# Patient Record
Sex: FEMALE | Race: WHITE | NOT HISPANIC OR LATINO | Employment: OTHER | URBAN - METROPOLITAN AREA
[De-identification: names, ages, dates, MRNs, and addresses within clinical notes are randomized per-mention and may not be internally consistent; named-entity substitution may affect disease eponyms.]

---

## 2019-06-11 ENCOUNTER — TELEPHONE (OUTPATIENT)
Dept: HEMATOLOGY/ONCOLOGY | Facility: CLINIC | Age: 59
End: 2019-06-11

## 2019-06-11 NOTE — TELEPHONE ENCOUNTER
Received referral for pt with diagnosis of anemia.  Spoke with pt who has concerns of transportation concerns making scheduled appointment.  Agreeable with appointment, slip mailed.

## 2019-06-12 ENCOUNTER — TELEPHONE (OUTPATIENT)
Dept: HEMATOLOGY/ONCOLOGY | Facility: CLINIC | Age: 59
End: 2019-06-12

## 2019-06-12 DIAGNOSIS — N18.9 ANEMIA IN CHRONIC KIDNEY DISEASE: Primary | ICD-10-CM

## 2019-06-12 DIAGNOSIS — D63.1 ANEMIA IN CHRONIC KIDNEY DISEASE: Primary | ICD-10-CM

## 2019-06-12 NOTE — TELEPHONE ENCOUNTER
----- Message from Sari Nugent RN sent at 6/12/2019 10:12 AM CDT -----  Can you put in a referral for hematology oncology

## 2019-06-20 ENCOUNTER — TELEPHONE (OUTPATIENT)
Dept: HEMATOLOGY/ONCOLOGY | Facility: CLINIC | Age: 59
End: 2019-06-20

## 2019-06-20 NOTE — TELEPHONE ENCOUNTER
----- Message from Tammie Banks sent at 6/20/2019  8:24 AM CDT -----  Contact: Sanjuana  Type: Needs Medical Advice    Who Called:  patient  Best Call Back Number: [phone]  Additional Information:  Calling to ask for a STEPHANIE form to be faxed to her at [phone] so her previous medical records will be there for appointment 6/26/19. Please call to advise. Thanks!

## 2019-06-20 NOTE — TELEPHONE ENCOUNTER
Spoke to pt to get fax number of referred provider to request records for pt apt. Fax number provided. Pt records are also found in media in epic.

## 2019-06-24 ENCOUNTER — TELEPHONE (OUTPATIENT)
Dept: HEMATOLOGY/ONCOLOGY | Facility: CLINIC | Age: 59
End: 2019-06-24

## 2019-06-24 NOTE — TELEPHONE ENCOUNTER
Spoke to pt to confirm that we received pt records through media in epic. Pt verbalized understanding.

## 2019-06-24 NOTE — TELEPHONE ENCOUNTER
----- Message from Yamel Jacobs sent at 6/24/2019  8:31 AM CDT -----  Contact: self   Patient called to check status of records that is being sent from Dr Markham office, please call back at [phone] (home)

## 2019-06-26 ENCOUNTER — INITIAL CONSULT (OUTPATIENT)
Dept: HEMATOLOGY/ONCOLOGY | Facility: CLINIC | Age: 59
End: 2019-06-26
Payer: MEDICAID

## 2019-06-26 VITALS
HEART RATE: 85 BPM | SYSTOLIC BLOOD PRESSURE: 133 MMHG | RESPIRATION RATE: 16 BRPM | WEIGHT: 178 LBS | TEMPERATURE: 98 F | BODY MASS INDEX: 30.39 KG/M2 | HEIGHT: 64 IN | DIASTOLIC BLOOD PRESSURE: 82 MMHG

## 2019-06-26 DIAGNOSIS — Z79.899 ON STATIN THERAPY: ICD-10-CM

## 2019-06-26 DIAGNOSIS — N18.30 STAGE 3 CHRONIC KIDNEY DISEASE: ICD-10-CM

## 2019-06-26 DIAGNOSIS — I10 ESSENTIAL HYPERTENSION: ICD-10-CM

## 2019-06-26 DIAGNOSIS — Z79.01 ANTICOAGULATED: ICD-10-CM

## 2019-06-26 DIAGNOSIS — D50.8 OTHER IRON DEFICIENCY ANEMIA: Primary | ICD-10-CM

## 2019-06-26 DIAGNOSIS — Z86.73 HISTORY OF EMBOLIC STROKE: ICD-10-CM

## 2019-06-26 PROBLEM — D50.9 IRON DEFICIENCY ANEMIA: Status: ACTIVE | Noted: 2019-06-26

## 2019-06-26 PROBLEM — N18.9 CKD (CHRONIC KIDNEY DISEASE): Status: ACTIVE | Noted: 2019-06-26

## 2019-06-26 PROCEDURE — 99213 OFFICE O/P EST LOW 20 MIN: CPT | Mod: PBBFAC,25 | Performed by: INTERNAL MEDICINE

## 2019-06-26 PROCEDURE — 99999 PR PBB SHADOW E&M-EST. PATIENT-LVL III: ICD-10-PCS | Mod: PBBFAC,,, | Performed by: INTERNAL MEDICINE

## 2019-06-26 PROCEDURE — 99205 PR OFFICE/OUTPT VISIT, NEW, LEVL V, 60-74 MIN: ICD-10-PCS | Mod: S$PBB,,, | Performed by: INTERNAL MEDICINE

## 2019-06-26 PROCEDURE — 99999 PR PBB SHADOW E&M-EST. PATIENT-LVL III: CPT | Mod: PBBFAC,,, | Performed by: INTERNAL MEDICINE

## 2019-06-26 PROCEDURE — 99205 OFFICE O/P NEW HI 60 MIN: CPT | Mod: S$PBB,,, | Performed by: INTERNAL MEDICINE

## 2019-06-26 PROCEDURE — G0444 DEPRESSION SCREEN ANNUAL: HCPCS | Mod: PBBFAC | Performed by: INTERNAL MEDICINE

## 2019-06-26 RX ORDER — CLOPIDOGREL BISULFATE 75 MG/1
TABLET ORAL
Refills: 11 | COMMUNITY
Start: 2019-04-30

## 2019-06-26 RX ORDER — INSULIN DEGLUDEC 200 U/ML
INJECTION, SOLUTION SUBCUTANEOUS
Refills: 2 | COMMUNITY
Start: 2019-06-01 | End: 2019-11-06 | Stop reason: SDUPTHER

## 2019-06-26 RX ORDER — INSULIN ASPART 100 [IU]/ML
INJECTION, SOLUTION INTRAVENOUS; SUBCUTANEOUS
Refills: 19 | COMMUNITY
Start: 2019-05-01

## 2019-06-26 RX ORDER — CARVEDILOL 25 MG/1
TABLET ORAL
Refills: 1 | COMMUNITY
Start: 2019-04-03

## 2019-06-26 RX ORDER — CHOLECALCIFEROL (VITAMIN D3) 125 MCG
5000 CAPSULE ORAL DAILY
COMMUNITY

## 2019-06-26 RX ORDER — FERROUS SULFATE 324(65)MG
325 TABLET, DELAYED RELEASE (ENTERIC COATED) ORAL DAILY
COMMUNITY

## 2019-06-26 RX ORDER — LIRAGLUTIDE 6 MG/ML
INJECTION SUBCUTANEOUS
Refills: 11 | COMMUNITY
Start: 2019-06-01

## 2019-06-26 RX ORDER — LEVOTHYROXINE SODIUM 75 UG/1
75 TABLET ORAL DAILY
Refills: 6 | COMMUNITY
Start: 2019-06-07

## 2019-06-26 RX ORDER — AMLODIPINE AND BENAZEPRIL HYDROCHLORIDE 5; 10 MG/1; MG/1
CAPSULE ORAL
Refills: 1 | COMMUNITY
Start: 2019-04-02

## 2019-06-26 RX ORDER — LOVASTATIN 40 MG/1
40 TABLET ORAL NIGHTLY
Refills: 1 | COMMUNITY
Start: 2019-06-19

## 2019-06-26 RX ORDER — SODIUM BICARBONATE 325 MG/1
325 TABLET ORAL 4 TIMES DAILY
COMMUNITY

## 2019-06-26 NOTE — PROGRESS NOTES
Anemia      Sanjuana Mendoza is a 58 y.o.  Pt with history of anemia treated with oral iron by Dr Markham at [facility]. SHe is unsure why she is anemic. She has CKD and is on plavix after suffering a stroke   She is changing to [facility] due to insurance purposes She is tolerating synthroid for thyroid disease  Past Medical History:   Diagnosis Date    Anemia     Diabetes     Hyperthyroidism     Stroke      Past Surgical History:   Procedure Laterality Date    kidney stone removal         Current Outpatient Medications:     amlodipine-benazepril 5-10 mg (LOTREL) 5-10 mg per capsule, , Disp: , Rfl: 1    carvedilol (COREG) 25 MG tablet, , Disp: , Rfl: 1    cholecalciferol, vitamin D3, 5,000 unit capsule, Take 5,000 Units by mouth once daily., Disp: , Rfl:     clopidogrel (PLAVIX) 75 mg tablet, , Disp: , Rfl: 11    ferrous sulfate 324 mg (65 mg iron) TbEC, Take 325 mg by mouth once daily., Disp: , Rfl:     levothyroxine (SYNTHROID) 75 MCG tablet, Take 75 mcg by mouth once daily., Disp: , Rfl: 6    lovastatin (MEVACOR) 40 MG tablet, Take 40 mg by mouth nightly., Disp: , Rfl: 1    NOVOLOG FLEXPEN U-100 INSULIN 100 unit/mL (3 mL) InPn pen, , Disp: , Rfl: 19    sodium bicarbonate 325 MG tablet, Take 325 mg by mouth 4 (four) times daily., Disp: , Rfl:     TRESIBA FLEXTOUCH U-200 200 unit/mL (3 mL) InPn, INJECT 40 UNITS SUBCUTANEOUSLY ONCE DAILY, Disp: , Rfl: 2    VICTOZA 3-HERB 0.6 mg/0.1 mL (18 mg/3 mL) PnIj, INJECT 1.8 MG SUBCUTANEOUSLY ONCE DAILY FOR 30 DAYS, Disp: , Rfl: 11  Review of patient's allergies indicates:  No Known Allergies  Social History     Tobacco Use    Smoking status: Never Smoker    Smokeless tobacco: Never Used   Substance Use Topics    Alcohol use: Not Currently    Drug use: Not on file     History reviewed. No pertinent family history.    CONSTITUTIONAL: No fevers, chills, night sweats, wt. loss, appetite changes  SKIN: no rashes or itching  ENT: No headaches, head trauma,  "vision changes, or eye pain  LYMPH NODES: None noticed   CV: No chest pain, palpitations.   RESP: No dyspnea on exertion, cough, wheezing, or hemoptysis  GI: No nausea, emesis, diarrhea, constipation, melena, hematochezia, pain.   : No dysuria, hematuria, urgency, or frequency   HEME: No easy bruising, bleeding problems  PSYCHIATRIC: No depression, anxiety, psychosis, hallucinations.  NEURO: No seizures, memory loss, dizziness or difficulty sleeping  MSK: No arthralgias or joint swelling         /82   Pulse 85   Temp 98 °F (36.7 °C) (Oral)   Resp 16   Ht 5' 4" (1.626 m)   Wt 80.7 kg (178 lb)   BMI 30.55 kg/m²   Gen: NAD, A and O x3,   Psych: pleasant affect, normal thought process  Eyes: Pupils round and non dilated, EOM intact  Nose: Nares patent  OP clear, mucosa patent  Neck: suppple, no JVD, trachea midline, no palpable mass, no adenopathy  Lungs: CTAB, no wheezes, no use of accessory muscles  CV: S1S2 with RRR, No mrg  Abd: soft, NTND, + BS, No HSM, no ascites  Extr: No CCE, ALEJANDRO, strength normal, good capillary refill  Neuro: steady gait, CNs grossly intact  Skin: intact, no lesions noted  Rheum: No joint swelling      No labs to review    Other iron deficiency anemia  -     Reticulocytes; Future; Expected date: 06/26/2019  -     Methylmalonic acid, serum; Future; Expected date: 06/26/2019  -     Iron and TIBC; Future; Expected date: 06/26/2019  -     Lactate dehydrogenase; Future; Expected date: 06/26/2019  -     Immunoglobulin free LT chains blood; Future; Expected date: 06/26/2019  -     Immunoglobulins (IgG, IgA, IgM) Quantitative; Future; Expected date: 06/26/2019  -     TSH; Future; Expected date: 06/26/2019  -     Folate; Future; Expected date: 06/26/2019  -     Beth test, indirect, qualitative; Future; Expected date: 06/26/2019  -     US Abdomen Complete; Future; Expected date: 06/26/2019    History of embolic stroke    Essential hypertension    Stage 3 chronic kidney " disease    Anticoagulated    On statin therapy    no pain   No falls  Low risk for thrombosis on plavix  No depression  Pt is to continue meds for HTN and thyroid disease.   Referral to PCP here   RTC 2-3 weeks after labs and ultrasound to review results and further recs  Thank you for allowing me to evaluate and participate in the care of this pleasant patient. Please fell free to call me with any questions or concerns.    Darby Davila MD    This note was dictated with Dragon and briefly proofread. Please excuse any sentences that may be unclear or words misspelled

## 2019-06-26 NOTE — LETTER
June 26, 2019      Monica Markham MD  [address]  [place] [address]           [facility] - Hematology Oncology  [address] [place] [address]  Phone: [phone]          Patient: Sanjuana Mendoza   MR Number: [id number]   YOB: 1960   Date of Visit: 6/26/2019       Dear Dr. Monica Markham:    Thank you for referring Sanjuana Mendoza to me for evaluation. Attached you will find relevant portions of my assessment and plan of care.    If you have questions, please do not hesitate to call me. I look forward to following Sanjuana Mendoza along with you.    Sincerely,    Darby Moreno MD    Enclosure  CC:  No Recipients    If you would like to receive this communication electronically, please contact [email or web address] or [phone] to request more information on [employer] Link access.    For providers and/or their staff who would like to refer a patient to [facility], please contact us through our one-stop-shop provider referral line, [facility], at [phone].    If you feel you have received this communication in error or would no longer like to receive these types of communications, please e-mail [email or web address]

## 2019-07-11 ENCOUNTER — HOSPITAL ENCOUNTER (OUTPATIENT)
Dept: RADIOLOGY | Facility: HOSPITAL | Age: 59
Discharge: HOME | End: 2019-07-11
Attending: INTERNAL MEDICINE
Payer: MEDICAID

## 2019-07-11 DIAGNOSIS — D50.8 OTHER IRON DEFICIENCY ANEMIA: ICD-10-CM

## 2019-07-11 PROCEDURE — 76700 US ABDOMEN COMPLETE: ICD-10-PCS | Mod: 26,,, | Performed by: RADIOLOGY

## 2019-07-11 PROCEDURE — 76700 US EXAM ABDOM COMPLETE: CPT | Mod: 26,,, | Performed by: RADIOLOGY

## 2019-07-11 PROCEDURE — 76700 US EXAM ABDOM COMPLETE: CPT | Mod: TC

## 2019-08-14 ENCOUNTER — OFFICE VISIT (OUTPATIENT)
Dept: HEMATOLOGY/ONCOLOGY | Facility: CLINIC | Age: 59
End: 2019-08-14
Payer: MEDICAID

## 2019-08-14 VITALS
OXYGEN SATURATION: 97 % | SYSTOLIC BLOOD PRESSURE: 117 MMHG | HEART RATE: 92 BPM | DIASTOLIC BLOOD PRESSURE: 62 MMHG | BODY MASS INDEX: 29.82 KG/M2 | WEIGHT: 179 LBS | RESPIRATION RATE: 18 BRPM | HEIGHT: 65 IN

## 2019-08-14 DIAGNOSIS — D50.9 IRON DEFICIENCY ANEMIA, UNSPECIFIED IRON DEFICIENCY ANEMIA TYPE: Primary | ICD-10-CM

## 2019-08-14 DIAGNOSIS — K76.0 HEPATIC STEATOSIS: ICD-10-CM

## 2019-08-14 DIAGNOSIS — Z79.01 ANTICOAGULATED: ICD-10-CM

## 2019-08-14 DIAGNOSIS — N18.30 STAGE 3 CHRONIC KIDNEY DISEASE: ICD-10-CM

## 2019-08-14 DIAGNOSIS — Z79.899 ON STATIN THERAPY: ICD-10-CM

## 2019-08-14 DIAGNOSIS — R76.8 ELEVATED SERUM IMMUNOGLOBULIN FREE LIGHT CHAIN LEVEL: ICD-10-CM

## 2019-08-14 DIAGNOSIS — D51.9 ANEMIA DUE TO VITAMIN B12 DEFICIENCY, UNSPECIFIED B12 DEFICIENCY TYPE: ICD-10-CM

## 2019-08-14 DIAGNOSIS — R63.5 WEIGHT GAIN: ICD-10-CM

## 2019-08-14 PROCEDURE — 99213 OFFICE O/P EST LOW 20 MIN: CPT | Mod: PBBFAC | Performed by: INTERNAL MEDICINE

## 2019-08-14 PROCEDURE — 99215 OFFICE O/P EST HI 40 MIN: CPT | Mod: S$PBB,,, | Performed by: INTERNAL MEDICINE

## 2019-08-14 PROCEDURE — 99999 PR PBB SHADOW E&M-EST. PATIENT-LVL III: ICD-10-PCS | Mod: PBBFAC,,, | Performed by: INTERNAL MEDICINE

## 2019-08-14 PROCEDURE — 99999 PR PBB SHADOW E&M-EST. PATIENT-LVL III: CPT | Mod: PBBFAC,,, | Performed by: INTERNAL MEDICINE

## 2019-08-14 PROCEDURE — 99215 PR OFFICE/OUTPT VISIT, EST, LEVL V, 40-54 MIN: ICD-10-PCS | Mod: S$PBB,,, | Performed by: INTERNAL MEDICINE

## 2019-08-14 RX ORDER — FLUCONAZOLE 100 MG/1
TABLET ORAL
COMMUNITY
End: 2019-08-14

## 2019-08-14 RX ORDER — PEN NEEDLE, DIABETIC 30 GX3/16"
NEEDLE, DISPOSABLE MISCELLANEOUS
COMMUNITY
Start: 2017-01-11

## 2019-08-14 RX ORDER — CLOPIDOGREL BISULFATE 75 MG/1
TABLET ORAL
COMMUNITY
Start: 2017-12-28 | End: 2019-08-14 | Stop reason: SDUPTHER

## 2019-08-14 RX ORDER — PEN NEEDLE, DIABETIC 29 G X1/2"
NEEDLE, DISPOSABLE MISCELLANEOUS
COMMUNITY
Start: 2015-11-22

## 2019-08-14 RX ORDER — ACETAMINOPHEN, DEXTROMETHORPHAN HBR, DOXYLAMINE SUCCINATE, PHENYLEPHRINE HCL 650; 20; 12.5; 1 MG/30ML; MG/30ML; MG/30ML; MG/30ML
1000 SOLUTION ORAL DAILY
Qty: 60 EACH | Refills: 0 | Status: SHIPPED | OUTPATIENT
Start: 2019-08-14 | End: 2019-10-13

## 2019-08-14 RX ORDER — INSULIN DEGLUDEC 100 U/ML
46 INJECTION, SOLUTION SUBCUTANEOUS
COMMUNITY

## 2019-08-14 NOTE — LETTER
August 14, 2019      Monica Markham MD  [address]  [place] [address]           [facility] - Hematology Oncology  [address] [place] [address]  Phone: [phone]          Patient: Sanjuana Mendoza   MR Number: [id number]   YOB: 1960   Date of Visit: 8/14/2019       Dear Dr. Monica Markham:    Thank you for referring Sanjuana Mendoza to me for evaluation. Attached you will find relevant portions of my assessment and plan of care.    If you have questions, please do not hesitate to call me. I look forward to following Sanjuana Mendoza along with you.    Sincerely,    Darby Moreno MD    Enclosure  CC:  No Recipients    If you would like to receive this communication electronically, please contact [email or web address] or [phone] to request more information on [employer] Link access.    For providers and/or their staff who would like to refer a patient to [facility], please contact us through our one-stop-shop provider referral line, [facility], at [phone].    If you feel you have received this communication in error or would no longer like to receive these types of communications, please e-mail [email or web address]

## 2019-08-14 NOTE — PROGRESS NOTES
CC Here for F/U of anemia     Sanjuana Mendoza is a 58 y.o.  Pt with history of anemia treated with oral iron by Dr Markham at [facility]. SHe is unsure why she is anemic. She has CKD and is on plavix after suffering a stroke   She is changing to [facility] due to insurance purposes She is tolerating synthroid for thyroid disease  Patient is feeling extremely fatigued but not having chest pain.  She does remain with intermittent shortness of breath.  She has been able to tolerate 1 oral iron daily    She has had an ultrasound of her abdomen since her last visit which reveals hepatic steatosis and cholelithiasis with no cholecystitis  Lab workup revealed low saturated iron  Past Medical History:   Diagnosis Date    Anemia     Diabetes     Hyperthyroidism     Stroke      Past Surgical History:   Procedure Laterality Date    kidney stone removal         Current Outpatient Medications:     amlodipine-benazepril 5-10 mg (LOTREL) 5-10 mg per capsule, , Disp: , Rfl: 1    carvedilol (COREG) 25 MG tablet, , Disp: , Rfl: 1    cholecalciferol, vitamin D3, 5,000 unit capsule, Take 5,000 Units by mouth once daily., Disp: , Rfl:     clopidogrel (PLAVIX) 75 mg tablet, , Disp: , Rfl: 11    ferrous sulfate 324 mg (65 mg iron) TbEC, Take 325 mg by mouth once daily., Disp: , Rfl:     levothyroxine (SYNTHROID) 75 MCG tablet, Take 75 mcg by mouth once daily., Disp: , Rfl: 6    lovastatin (MEVACOR) 40 MG tablet, Take 40 mg by mouth nightly., Disp: , Rfl: 1    NOVOLOG FLEXPEN U-100 INSULIN 100 unit/mL (3 mL) InPn pen, , Disp: , Rfl: 19    sodium bicarbonate 325 MG tablet, Take 325 mg by mouth 4 (four) times daily., Disp: , Rfl:     TRESIBA FLEXTOUCH U-200 200 unit/mL (3 mL) InPn, INJECT 40 UNITS SUBCUTANEOUSLY ONCE DAILY, Disp: , Rfl: 2    VICTOZA 3-HERB 0.6 mg/0.1 mL (18 mg/3 mL) PnIj, INJECT 1.8 MG SUBCUTANEOUSLY ONCE DAILY FOR 30 DAYS, Disp: , Rfl: 11  Review of patient's allergies indicates:  No Known  "Allergies  Social History     Tobacco Use    Smoking status: Never Smoker    Smokeless tobacco: Never Used   Substance Use Topics    Alcohol use: Not Currently    Drug use: Not on file     No family history on file.    CONSTITUTIONAL: No fevers, chills, night sweats, positive weight gain positive fatigue  SKIN: no rashes or itching  ENT: No headaches, head trauma, vision changes, or eye pain  LYMPH NODES: None noticed   CV: No chest pain, palpitations.   RESP: No dyspnea on exertion, cough, wheezing, or hemoptysis  GI: No nausea, emesis, diarrhea, constipation, melena, hematochezia, pain.   : No dysuria, hematuria, urgency, or frequency   HEME: No easy bruising, bleeding problems  PSYCHIATRIC: No depression, anxiety, psychosis, hallucinations.  NEURO: No seizures, memory loss, dizziness or difficulty sleeping  MSK: No arthralgias   Gait requiring a walker         /62   Pulse 92   Resp 18   Ht 5' 5" (1.651 m)   Wt 81.2 kg (179 lb)   SpO2 97%   BMI 29.79 kg/m²   Gen: NAD, A and O x3, well groomed conversant  Psych: pleasant affect, normal thought process no evidence of depression or anxiety today  Eyes: Pupils round and non dilated, EOM intact no nystagmus nares are patent non boggy turbinates  OP clear, mucosa patent  Neck: suppple, no JVD, trachea midline, no palpable mass, no adenopathy  Lungs: CTAB, no wheezes, no use of accessory muscles  CV: S1S2 with RRR, No mrg  Abd: soft, NTND, + BS, No HSM, no ascites  Extr: No CCE, ALEJANDRO, strength normal, good capillary refill  Neuro: steady gait, CNs grossly intact  Skin: intact, no lesions noted  Rheum: No joint swelling  Labs have been reviewed  Imaging studies reviewed  Iron deficiency anemia, unspecified iron deficiency anemia type  -     CBC auto differential; Future; Expected date: 10/14/2019  -     CMP; Future; Expected date: 10/14/2019  -     Iron and TIBC; Future; Expected date: 10/14/2019  -     FERRITIN; Future; Expected date: " 10/14/2019    Weight gain    Elevated serum immunoglobulin free light chain level    Stage 3 chronic kidney disease    Anticoagulated    On statin therapy    Hepatic steatosis    Anemia due to vitamin B12 deficiency, unspecified B12 deficiency type  -     cyanocobalamin, vitamin B-12, 1,000 mcg TbSR; Take 1,000 mcg by mouth once daily.  Dispense: 60 each; Refill: 0         Iron def anemia patient prefers oral replacement over IV replacement for now  Increase oral iron to TID and recheck cbc with renal function in 8 weeks   Explain the elevated light chains are more than likely due to her chronic kidney disease as the kappa and lambda levels are both increased.  No evidence of peripheral destruction of her cells.  She may need follow-up with primary care physician to have her thyroid studies recheck does well  no pain   No falls  Hepatic steatosis while on Coreg.  She will follow up with her primary care doctor to discuss further that this medication could be adding to her fatty liver  She should abstain from fatty foods and try to decrease her BMI  Low risk for thrombosis on plavix  No depression  Pt is to continue meds for HTN and thyroid disease.   She has felt with her nephrologist discussed ways to improve her kidney function.  Proper hydration been discussed as well.  I will see her back in 8 weeks if her iron levels are not better and if her CBC is not shown any improvement then I will go ahead and offer her IV iron replacement  Thank you for allowing me to evaluate and participate in the care of this pleasant patient. Please fell free to call me with any questions or concerns.    Warmly,  Darby Moreno MD    This note was dictated with Dragon and briefly proofread. Please excuse any sentences that may be unclear or words misspelled

## 2019-08-30 ENCOUNTER — TELEPHONE (OUTPATIENT)
Dept: HEMATOLOGY/ONCOLOGY | Facility: CLINIC | Age: 59
End: 2019-08-30

## 2019-08-30 NOTE — TELEPHONE ENCOUNTER
----- Message from Keeley Sotomayor sent at 8/28/2019  2:01 PM CDT -----  Type: Needs Medical Advice    Who Called:  self  Symptoms (please be specific):  Asking to discuss medication diflucan for yeast infection   How long has patient had these symptoms:  Took a dose last Friday / if not better she is supposed to take a 2 nd dose this Friday                                                                         Pharmacy name and phone #:     Best Call Back Number: [phone]   Additional Information: almost sure Dr Moreno told her not to take that medication ?

## 2019-08-30 NOTE — TELEPHONE ENCOUNTER
Spoke to pt to inform her that per dr goodman, she can take her second dose of diflucan if infection has not went away f/u with her pcp. Pt verbalized understanding.

## 2019-10-07 ENCOUNTER — LAB VISIT (OUTPATIENT)
Dept: LAB | Facility: HOSPITAL | Age: 59
End: 2019-10-07
Attending: INTERNAL MEDICINE
Payer: MEDICAID

## 2019-10-07 DIAGNOSIS — D50.9 IRON DEFICIENCY ANEMIA, UNSPECIFIED IRON DEFICIENCY ANEMIA TYPE: ICD-10-CM

## 2019-10-07 LAB
ALBUMIN SERPL BCP-MCNC: 3.5 G/DL (ref 3.5–5.2)
ALP SERPL-CCNC: 140 U/L (ref 55–135)
ALT SERPL W/O P-5'-P-CCNC: 30 U/L (ref 10–44)
ANION GAP SERPL CALC-SCNC: 9 MMOL/L (ref 8–16)
AST SERPL-CCNC: 20 U/L (ref 10–40)
BASOPHILS # BLD AUTO: 0.03 K/UL (ref 0–0.2)
BASOPHILS NFR BLD: 0.3 % (ref 0–1.9)
BILIRUB SERPL-MCNC: 0.5 MG/DL (ref 0.1–1)
BUN SERPL-MCNC: 45 MG/DL (ref 6–20)
CALCIUM SERPL-MCNC: 8.3 MG/DL (ref 8.7–10.5)
CHLORIDE SERPL-SCNC: 106 MMOL/L (ref 95–110)
CO2 SERPL-SCNC: 19 MMOL/L (ref 23–29)
CREAT SERPL-MCNC: 2.7 MG/DL (ref 0.5–1.4)
DIFFERENTIAL METHOD: ABNORMAL
EOSINOPHIL # BLD AUTO: 0.3 K/UL (ref 0–0.5)
EOSINOPHIL NFR BLD: 2.8 % (ref 0–8)
ERYTHROCYTE [DISTWIDTH] IN BLOOD BY AUTOMATED COUNT: 12.4 % (ref 11.5–14.5)
EST. GFR  (AFRICAN AMERICAN): 21.3 ML/MIN/1.73 M^2
EST. GFR  (NON AFRICAN AMERICAN): 18.5 ML/MIN/1.73 M^2
FERRITIN SERPL-MCNC: 273 NG/ML (ref 20–300)
GLUCOSE SERPL-MCNC: 258 MG/DL (ref 70–110)
HCT VFR BLD AUTO: 28.7 % (ref 37–48.5)
HGB BLD-MCNC: 9.4 G/DL (ref 12–16)
IMM GRANULOCYTES # BLD AUTO: 0.07 K/UL (ref 0–0.04)
IMM GRANULOCYTES NFR BLD AUTO: 0.6 % (ref 0–0.5)
IRON SERPL-MCNC: 71 UG/DL (ref 30–160)
LYMPHOCYTES # BLD AUTO: 1.6 K/UL (ref 1–4.8)
LYMPHOCYTES NFR BLD: 14.1 % (ref 18–48)
MCH RBC QN AUTO: 30.9 PG (ref 27–31)
MCHC RBC AUTO-ENTMCNC: 32.8 G/DL (ref 32–36)
MCV RBC AUTO: 94 FL (ref 82–98)
MONOCYTES # BLD AUTO: 1 K/UL (ref 0.3–1)
MONOCYTES NFR BLD: 8.8 % (ref 4–15)
NEUTROPHILS # BLD AUTO: 8.5 K/UL (ref 1.8–7.7)
NEUTROPHILS NFR BLD: 73.4 % (ref 38–73)
NRBC BLD-RTO: 0 /100 WBC
PLATELET # BLD AUTO: 335 K/UL (ref 150–350)
PMV BLD AUTO: 9.8 FL (ref 9.2–12.9)
POTASSIUM SERPL-SCNC: 5.3 MMOL/L (ref 3.5–5.1)
PROT SERPL-MCNC: 7.4 G/DL (ref 6–8.4)
RBC # BLD AUTO: 3.04 M/UL (ref 4–5.4)
SATURATED IRON: 25 % (ref 20–50)
SODIUM SERPL-SCNC: 134 MMOL/L (ref 136–145)
TOTAL IRON BINDING CAPACITY: 284 UG/DL (ref 250–450)
TRANSFERRIN SERPL-MCNC: 192 MG/DL (ref 200–375)
WBC # BLD AUTO: 11.61 K/UL (ref 3.9–12.7)

## 2019-10-07 PROCEDURE — 83540 ASSAY OF IRON: CPT

## 2019-10-07 PROCEDURE — 85025 COMPLETE CBC W/AUTO DIFF WBC: CPT

## 2019-10-07 PROCEDURE — 82728 ASSAY OF FERRITIN: CPT

## 2019-10-07 PROCEDURE — 80053 COMPREHEN METABOLIC PANEL: CPT

## 2019-10-07 PROCEDURE — 36415 COLL VENOUS BLD VENIPUNCTURE: CPT

## 2019-10-09 ENCOUNTER — TELEPHONE (OUTPATIENT)
Dept: HEMATOLOGY/ONCOLOGY | Facility: CLINIC | Age: 59
End: 2019-10-09

## 2019-10-09 ENCOUNTER — OFFICE VISIT (OUTPATIENT)
Dept: HEMATOLOGY/ONCOLOGY | Facility: CLINIC | Age: 59
End: 2019-10-09
Payer: MEDICAID

## 2019-10-09 VITALS
SYSTOLIC BLOOD PRESSURE: 147 MMHG | OXYGEN SATURATION: 98 % | HEART RATE: 90 BPM | TEMPERATURE: 98 F | WEIGHT: 180 LBS | DIASTOLIC BLOOD PRESSURE: 75 MMHG | BODY MASS INDEX: 29.99 KG/M2 | HEIGHT: 65 IN | RESPIRATION RATE: 16 BRPM

## 2019-10-09 DIAGNOSIS — R76.8 ELEVATED SERUM IMMUNOGLOBULIN FREE LIGHT CHAIN LEVEL: ICD-10-CM

## 2019-10-09 DIAGNOSIS — E83.51 HYPOCALCEMIA: ICD-10-CM

## 2019-10-09 DIAGNOSIS — N18.30 STAGE 3 CHRONIC KIDNEY DISEASE: ICD-10-CM

## 2019-10-09 DIAGNOSIS — E87.5 HYPERKALEMIA: Primary | ICD-10-CM

## 2019-10-09 DIAGNOSIS — D47.2 MGUS (MONOCLONAL GAMMOPATHY OF UNKNOWN SIGNIFICANCE): ICD-10-CM

## 2019-10-09 DIAGNOSIS — D51.9 ANEMIA DUE TO VITAMIN B12 DEFICIENCY, UNSPECIFIED B12 DEFICIENCY TYPE: ICD-10-CM

## 2019-10-09 DIAGNOSIS — I10 ESSENTIAL HYPERTENSION: ICD-10-CM

## 2019-10-09 DIAGNOSIS — Z79.899 ON STATIN THERAPY: ICD-10-CM

## 2019-10-09 DIAGNOSIS — Z79.01 ANTICOAGULATED: ICD-10-CM

## 2019-10-09 PROCEDURE — 99999 PR PBB SHADOW E&M-EST. PATIENT-LVL III: ICD-10-PCS | Mod: PBBFAC,,, | Performed by: INTERNAL MEDICINE

## 2019-10-09 PROCEDURE — 99215 OFFICE O/P EST HI 40 MIN: CPT | Mod: S$PBB,,, | Performed by: INTERNAL MEDICINE

## 2019-10-09 PROCEDURE — 99215 PR OFFICE/OUTPT VISIT, EST, LEVL V, 40-54 MIN: ICD-10-PCS | Mod: S$PBB,,, | Performed by: INTERNAL MEDICINE

## 2019-10-09 PROCEDURE — 99999 PR PBB SHADOW E&M-EST. PATIENT-LVL III: CPT | Mod: PBBFAC,,, | Performed by: INTERNAL MEDICINE

## 2019-10-09 PROCEDURE — G0444 DEPRESSION SCREEN ANNUAL: HCPCS | Mod: PBBFAC | Performed by: INTERNAL MEDICINE

## 2019-10-09 PROCEDURE — 99213 OFFICE O/P EST LOW 20 MIN: CPT | Mod: PBBFAC,25 | Performed by: INTERNAL MEDICINE

## 2019-10-09 RX ORDER — FLUCONAZOLE 150 MG/1
TABLET ORAL
Refills: 0 | COMMUNITY
Start: 2019-08-21 | End: 2019-11-06

## 2019-10-09 NOTE — PROGRESS NOTES
"CC Here for F/U of anemia     Sanjuana Mendoza is a 58 y.o.  Pt with history of anemia treated with oral iron by Dr Markham at [facility] [place]. Here to check counts after taking oral iron   She has CKD and is on plavix after suffering a stroke   She is changing to [facility] due to insurance purposes She is tolerating synthroid for thyroid disease    She does remain with intermittent shortness of breath.      She has had an ultrasound of her abdomen since her last visit which reveals hepatic steatosis and cholelithiasis with no cholecystitis   Tolerating insulin for diabetes and has abstained from salt for HTN         Current Outpatient Medications:     amlodipine-benazepril 5-10 mg (LOTREL) 5-10 mg per capsule, , Disp: , Rfl: 1    carvedilol (COREG) 25 MG tablet, , Disp: , Rfl: 1    cholecalciferol, vitamin D3, 5,000 unit capsule, Take 5,000 Units by mouth once daily., Disp: , Rfl:     clopidogrel (PLAVIX) 75 mg tablet, , Disp: , Rfl: 11    cyanocobalamin, vitamin B-12, 1,000 mcg TbSR, Take 1,000 mcg by mouth once daily., Disp: 60 each, Rfl: 0    ferrous sulfate 324 mg (65 mg iron) TbEC, Take 325 mg by mouth once daily., Disp: , Rfl:     insulin degludec (TRESIBA FLEXTOUCH U-100) 100 unit/mL (3 mL) InPn, Inject 46 Units into the skin., Disp: , Rfl:     levothyroxine (SYNTHROID) 75 MCG tablet, Take 75 mcg by mouth once daily., Disp: , Rfl: 6    lovastatin (MEVACOR) 40 MG tablet, Take 40 mg by mouth nightly., Disp: , Rfl: 1    NOVOLOG FLEXPEN U-100 INSULIN 100 unit/mL (3 mL) InPn pen, , Disp: , Rfl: 19    pen needle, diabetic (RELION PEN NEEDLES) 32 gauge x 5/32" Ndle, Use once daily with insulin, Disp: , Rfl:     pen needle, diabetic 31 gauge x 1/3" Ndle, Use once daily with Lantus insulin, Disp: , Rfl:     sodium bicarbonate 325 MG tablet, Take 325 mg by mouth 4 (four) times daily., Disp: , Rfl:     TRESIBA FLEXTOUCH U-200 200 unit/mL (3 mL) InPn, INJECT 40 UNITS SUBCUTANEOUSLY ONCE DAILY, Disp: , Rfl: " "2    VICTOZA 3-HERB 0.6 mg/0.1 mL (18 mg/3 mL) PnIj, INJECT 1.8 MG SUBCUTANEOUSLY ONCE DAILY FOR 30 DAYS, Disp: , Rfl: 11    fluconazole (DIFLUCAN) 150 MG Tab, TAKE ONE TABLET BY MOUTH AS A ONE TIME DOSE MAY REPEAT DOSE IN 1 WEEK, Disp: , Rfl: 0  Review of patient's allergies indicates:  No Known Allergies  Social History     Tobacco Use    Smoking status: Never Smoker    Smokeless tobacco: Never Used   Substance Use Topics    Alcohol use: Not Currently    Drug use: Not on file     History reviewed. No pertinent family history.    CONSTITUTIONAL: No fevers, chills, night sweats, positive weight gain positive fatigue  SKIN: no rashes or itching  ENT: No headaches, head trauma, vision changes, or eye pain  LYMPH NODES: None noticed   CV: No chest pain, palpitations.   RESP: No dyspnea on exertion, cough, wheezing, or hemoptysis  GI: No nausea, emesis, diarrhea, constipation, melena, hematochezia, pain.   : No dysuria, hematuria, urgency, or frequency   HEME: No easy bruising, bleeding problems  PSYCHIATRIC: No depression, anxiety, psychosis, hallucinations.  NEURO: No seizures, memory loss, dizziness or difficulty sleeping  MSK: No arthralgias   Gait requiring a walker         BP (!) 147/75   Pulse 90   Temp 97.8 °F (36.6 °C) (Oral)   Resp 16   Ht 5' 5" (1.651 m)   Wt 81.6 kg (180 lb)   SpO2 98%   BMI 29.95 kg/m²   Gen: NAD, A and O x3, well groomed conversant  Psych: pleasant affect, normal thought process no evidence of depression or anxiety today  Eyes: Pupils round and non dilated, EOM intact no nystagmus nares are patent non boggy turbinates  OP clear, mucosa patent  Neck: suppple, no JVD, trachea midline, no palpable mass, no adenopathy  Lungs: CTAB, no wheezes, no use of accessory muscles  CV: S1S2 with RRR, No mrg  Abd: soft, NTND, + BS, No HSM, no ascites  Extr: No CCE, ALEJANDRO, strength normal, good capillary refill  Neuro: steady gait, CNs grossly intact  Skin: intact, no lesions noted  Rheum: No " joint swelling  Labs have been reviewed  Imaging studies reviewed      Hyperkalemia  -     sodium polystyrene (KAYEXALATE) 15 gram/60 mL Susp; Take 60 mLs (15 g total) by mouth once. for 1 dose  Dispense: 60 mL; Refill: 0    Anemia due to vitamin B12 deficiency, unspecified B12 deficiency type  -     BMP; Future; Expected date: 10/09/2019  -     Calcitriol; Future; Expected date: 10/09/2019  -     CBC auto differential; Future; Expected date: 10/09/2019    Hypocalcemia  -     BMP; Future; Expected date: 10/09/2019  -     Calcitriol; Future; Expected date: 10/09/2019  -     CBC auto differential; Future; Expected date: 10/09/2019    Anticoagulated    Stage 3 chronic kidney disease    On statin therapy    Essential hypertension         Iron def anemia with much improvement from oral iron and B vitamins   Elevated potassium start kayexalate   Recheck bmp next week   Add calcium 600 mg daily she [place] [address] daily  Decrease D3 to MWF   sedning for skel survey : myeloma is in the differential daignosis     Explain the elevated light chains are more than likely due to her chronic kidney disease as the kappa and lambda levels are both increased.  Time spent over 45 minutes with > 50 % in counseling   no pain   No falls  Hepatic steatosis while on Coreg.  She will follow up with her primary care doctor to discuss further that this medication could be adding to her fatty liver  She should abstain from fatty foods and try to decrease her BMI  Low risk for thrombosis on plavix  No depression  Pt is to continue meds for HTN and thyroid disease.   She has felt with her nephrologist discussed ways to improve her kidney function.  Proper hydration been discussed as well.   cont statin to decrease plaques   Cont synthroid for thyroid control   Thank you for allowing me to evaluate and participate in the care of this pleasant patient. Please fell free to call me with any questions or concerns.    Darby Davila,  MD    This note was dictated with Jesuson and briefly proofread. Please excuse any sentences that may be unclear or words misspelled

## 2019-10-09 NOTE — TELEPHONE ENCOUNTER
----- Message from Leatha Skip sent at 10/9/2019  1:08 PM CDT -----  Contact: Patient  Type:  Patient Requesting Referral    Who Called:  Patient  Does the patient already have the specialty appointment scheduled?:  yes  If yes, what is the date of that appointment?:  November 18, 2019   Referral to What Specialty:  nephrology  Reason for Referral:  Kidney issues  Does the patient want the referral with a specific physician?:  Dr. Fernandez Downing  Is the specialist an [facility] or [facility]?:  non  Patient Requesting a Call Back?:  yes  Best Call Back Number:  [phone]

## 2019-10-09 NOTE — LETTER
October 9, 2019      Monica Markham MD  [address]  [place] [address]           [facility] - Hematology Oncology  [address] [place] [address]  Phone: [phone]          Patient: Sanjuana Mendoza   MR Number: [id number]   YOB: 1960   Date of Visit: 10/9/2019       Dear Dr. Monica Markham:    Thank you for referring Sanjuana Mendoza to me for evaluation. Attached you will find relevant portions of my assessment and plan of care.    If you have questions, please do not hesitate to call me. I look forward to following Sanjuana Mendoza along with you.    Sincerely,    Darby Moreno MD    Enclosure  CC:  No Recipients    If you would like to receive this communication electronically, please contact [email or web address] or [phone] to request more information on [employer] Link access.    For providers and/or their staff who would like to refer a patient to [facility], please contact us through our one-stop-shop provider referral line, [facility], at [phone].    If you feel you have received this communication in error or would no longer like to receive these types of communications, please e-mail [email or web address]

## 2019-10-15 ENCOUNTER — HOSPITAL ENCOUNTER (OUTPATIENT)
Dept: RADIOLOGY | Facility: HOSPITAL | Age: 59
Discharge: HOME | End: 2019-10-15
Attending: INTERNAL MEDICINE
Payer: MEDICAID

## 2019-10-15 DIAGNOSIS — D47.2 MGUS (MONOCLONAL GAMMOPATHY OF UNKNOWN SIGNIFICANCE): ICD-10-CM

## 2019-10-15 PROCEDURE — 77075 XR METASTATIC SURVEY: ICD-10-PCS | Mod: 26,,, | Performed by: RADIOLOGY

## 2019-10-15 PROCEDURE — 70260 X-RAY EXAM OF SKULL: CPT | Mod: 26,,, | Performed by: RADIOLOGY

## 2019-10-15 PROCEDURE — 77075 RADEX OSSEOUS SURVEY COMPL: CPT | Mod: TC

## 2019-10-15 PROCEDURE — 70260 XR SKULL COMPLETE MIN 4 VIEWS: ICD-10-PCS | Mod: 26,,, | Performed by: RADIOLOGY

## 2019-10-15 PROCEDURE — 70260 X-RAY EXAM OF SKULL: CPT | Mod: TC,FY

## 2019-10-15 PROCEDURE — 77075 RADEX OSSEOUS SURVEY COMPL: CPT | Mod: 26,,, | Performed by: RADIOLOGY

## 2019-10-23 ENCOUNTER — TELEPHONE (OUTPATIENT)
Dept: HEMATOLOGY/ONCOLOGY | Facility: CLINIC | Age: 59
End: 2019-10-23

## 2019-10-23 ENCOUNTER — OFFICE VISIT (OUTPATIENT)
Dept: HEMATOLOGY/ONCOLOGY | Facility: CLINIC | Age: 59
End: 2019-10-23
Payer: MEDICAID

## 2019-10-23 VITALS
WEIGHT: 183 LBS | RESPIRATION RATE: 18 BRPM | OXYGEN SATURATION: 95 % | SYSTOLIC BLOOD PRESSURE: 126 MMHG | BODY MASS INDEX: 30.49 KG/M2 | TEMPERATURE: 96 F | DIASTOLIC BLOOD PRESSURE: 75 MMHG | HEIGHT: 65 IN | HEART RATE: 92 BPM

## 2019-10-23 DIAGNOSIS — R93.89 ABNORMAL X-RAY: Primary | ICD-10-CM

## 2019-10-23 DIAGNOSIS — N18.30 STAGE 3 CHRONIC KIDNEY DISEASE: ICD-10-CM

## 2019-10-23 DIAGNOSIS — R76.8 ELEVATED SERUM IMMUNOGLOBULIN FREE LIGHT CHAIN LEVEL: ICD-10-CM

## 2019-10-23 DIAGNOSIS — M79.89 LEG SWELLING: ICD-10-CM

## 2019-10-23 PROCEDURE — 99214 PR OFFICE/OUTPT VISIT, EST, LEVL IV, 30-39 MIN: ICD-10-PCS | Mod: S$PBB,,, | Performed by: INTERNAL MEDICINE

## 2019-10-23 PROCEDURE — 99214 OFFICE O/P EST MOD 30 MIN: CPT | Mod: S$PBB,,, | Performed by: INTERNAL MEDICINE

## 2019-10-23 PROCEDURE — 99213 OFFICE O/P EST LOW 20 MIN: CPT | Mod: PBBFAC,25 | Performed by: INTERNAL MEDICINE

## 2019-10-23 PROCEDURE — G0444 DEPRESSION SCREEN ANNUAL: HCPCS | Mod: PBBFAC | Performed by: INTERNAL MEDICINE

## 2019-10-23 PROCEDURE — 99999 PR PBB SHADOW E&M-EST. PATIENT-LVL III: CPT | Mod: PBBFAC,,, | Performed by: INTERNAL MEDICINE

## 2019-10-23 PROCEDURE — 99999 PR PBB SHADOW E&M-EST. PATIENT-LVL III: ICD-10-PCS | Mod: PBBFAC,,, | Performed by: INTERNAL MEDICINE

## 2019-10-23 RX ORDER — UBIDECARENONE 75 MG
2500 CAPSULE ORAL DAILY
COMMUNITY

## 2019-10-23 RX ORDER — SODIUM POLYSTYRENE SULFONATE 15 G/60ML
SUSPENSION ORAL; RECTAL
Refills: 0 | COMMUNITY
Start: 2019-10-09 | End: 2019-11-06

## 2019-10-23 NOTE — LETTER
October 23, 2019      Monica Markham MD  [address]  [place] [address]           [facility]  [address] [place] [address]  Phone: [phone]          Patient: Sanjuana Mendoza   MR Number: [id number]   YOB: 1960   Date of Visit: 10/23/2019       Dear Dr. Monica Markham:    Thank you for referring Sanjuana Mendoza to me for evaluation. Attached you will find relevant portions of my assessment and plan of care.    If you have questions, please do not hesitate to call me. I look forward to following Sanjuana Mendoza along with you.    Sincerely,    Darby Moreno MD    Enclosure  CC:  No Recipients    If you would like to receive this communication electronically, please contact [email or web address] or [phone] to request more information on [employer] Link access.    For providers and/or their staff who would like to refer a patient to [facility], please contact us through our one-stop-shop provider referral line, [facility], at [phone].    If you feel you have received this communication in error or would no longer like to receive these types of communications, please e-mail [email or web address]

## 2019-10-23 NOTE — TELEPHONE ENCOUNTER
Pt wanted to leave office before appts for imaging and f/u were made.  Pt asked to be called with appt times.  Called patient to inform her of appt times, no answer and unable to LVM.  Will continue to try to reach pt.

## 2019-10-23 NOTE — PROGRESS NOTES
"CC Here for F/U of anemia and elev light chains    Post skel survey     Sanjauna Mendoza is a 58 y.o.  Pt with history of anemia treated with oral iron by Dr Markham at [facility]. Here to check counts after taking oral iron   Post colonoscopy and EGD at [facility] : negative per patient   She has CKD and is on plavix after suffering a stroke   She is changing to [facility] due to insurance purposes She is tolerating synthroid for thyroid disease    She has had an ultrasound of her abdomen since her last visit which reveals hepatic steatosis and cholelithiasis with no cholecystitis   Tolerating insulin for diabetes and has abstained from salt for HTN     Iron levels have improved   Hemoglobin decreased only by 0.1  skel survey with osteoporosis and ? Lytic lesion in tibia  MRI recommended    Pt states she has had swelling of the left leg, no pain         Current Outpatient Medications:     amlodipine-benazepril 5-10 mg (LOTREL) 5-10 mg per capsule, , Disp: , Rfl: 1    carvedilol (COREG) 25 MG tablet, , Disp: , Rfl: 1    clopidogrel (PLAVIX) 75 mg tablet, , Disp: , Rfl: 11    cyanocobalamin 500 MCG tablet, Take 2,500 mcg by mouth once daily., Disp: , Rfl:     ferrous sulfate 324 mg (65 mg iron) TbEC, Take 325 mg by mouth once daily., Disp: , Rfl:     fluconazole (DIFLUCAN) 150 MG Tab, TAKE ONE TABLET BY MOUTH AS A ONE TIME DOSE MAY REPEAT DOSE IN 1 WEEK, Disp: , Rfl: 0    insulin degludec (TRESIBA FLEXTOUCH U-100) 100 unit/mL (3 mL) InPn, Inject 46 Units into the skin., Disp: , Rfl:     levothyroxine (SYNTHROID) 75 MCG tablet, Take 75 mcg by mouth once daily., Disp: , Rfl: 6    lovastatin (MEVACOR) 40 MG tablet, Take 40 mg by mouth nightly., Disp: , Rfl: 1    NOVOLOG FLEXPEN U-100 INSULIN 100 unit/mL (3 mL) InPn pen, , Disp: , Rfl: 19    pen needle, diabetic (RELION PEN NEEDLES) 32 gauge x 5/32" Ndle, Use once daily with insulin, Disp: , Rfl:     pen needle, diabetic 31 gauge x 1/3" Ndle, Use once daily " "with Lantus insulin, Disp: , Rfl:     sodium bicarbonate 325 MG tablet, Take 325 mg by mouth 4 (four) times daily., Disp: , Rfl:     SPS, WITH SORBITOL, 15-20 gram/60 mL Susp, TAKE 60 ML BY MOUTH ONCE DAILY AS A ONE TIME DOSE, Disp: , Rfl: 0    TRESIBA FLEXTOUCH U-200 200 unit/mL (3 mL) InPn, INJECT 40 UNITS SUBCUTANEOUSLY ONCE DAILY, Disp: , Rfl: 2    VICTOZA 3-HERB 0.6 mg/0.1 mL (18 mg/3 mL) PnIj, INJECT 1.8 MG SUBCUTANEOUSLY ONCE DAILY FOR 30 DAYS, Disp: , Rfl: 11    cholecalciferol, vitamin D3, 5,000 unit capsule, Take 5,000 Units by mouth once daily., Disp: , Rfl:   Review of patient's allergies indicates:  No Known Allergies  Social History     Tobacco Use    Smoking status: Never Smoker    Smokeless tobacco: Never Used   Substance Use Topics    Alcohol use: Not Currently    Drug use: Not on file     History reviewed. No pertinent family history.  No change since last visit in history   CONSTITUTIONAL: No fevers, chills, night sweats, positive weight gain positive fatigue  SKIN: no rashes or itching  ENT: No headaches, head trauma, vision changes, or eye pain  LYMPH NODES: None noticed   CV: No chest pain, palpitations.  Leg swelling   RESP: No dyspnea on exertion, cough, wheezing, or hemoptysis  GI: No nausea, emesis, diarrhea, constipation, melena, hematochezia, pain.   : No dysuria, hematuria, urgency, or frequency   HEME: No easy bruising, bleeding problems  PSYCHIATRIC: No depression, anxiety, psychosis, hallucinations.  NEURO: No seizures, memory loss, dizziness or difficulty sleeping  MSK: No arthralgias   Gait requiring a walker         /75   Pulse 92   Temp 96.4 °F (35.8 °C) (Oral)   Resp 18   Ht 5' 5" (1.651 m)   Wt 83 kg (183 lb)   SpO2 95%   BMI 30.45 kg/m²   Gen: NAD, A and O x3, well groomed conversant  Psych: pleasant affect, normal thought process no evidence of depression or anxiety today  Eyes: Pupils round and non dilated, EOM intact no nystagmus nares are patent non " boggy turbinates  OP clear, mucosa patent  Neck: suppple, no JVD, trachea midline, no palpable mass, no adenopathy  Lungs: CTAB, no wheezes, no use of accessory muscles  CV: S1S2 with RRR, No mrg  Abd: soft, NTND, + BS, No HSM, no ascites  Extr: No CC  ALEJANDRO, strength normal, good capillary refill left leg swelling   Neuro: steady gait, CNs grossly intact  Skin: intact, no lesions noted  Rheum: No joint swelling  Labs have been reviewed  Imaging studies reviewed    Ref Range & Gacgc6ag ago  Kxiwymfm83.0 - 300.0 ng/mL273     Ref Range & Xteqm5wl ago  Iron30 - 160 ug/dL71 Mbcuhmqhcsk552 - 375 mg/sI661Kii  GSHQ977 - 450 ug/dL284 Saturated Iron20 - 50 %25     Lab Results   Component Value Date    WBC 11.93 10/15/2019    RBC 3.00 (L) 10/15/2019    HGB 9.3 (L) 10/15/2019    HCT 28.0 (L) 10/15/2019    MCV 93 10/15/2019    MCH 31.0 10/15/2019    MCHC 33.2 10/15/2019    RDW 12.4 10/15/2019     10/15/2019    MPV 9.3 10/15/2019    GRAN 9.2 (H) 10/15/2019    GRAN 77.1 (H) 10/15/2019    LYMPH 1.5 10/15/2019    LYMPH 12.2 (L) 10/15/2019    MONO 0.9 10/15/2019    MONO 7.7 10/15/2019    EOS 0.3 10/15/2019    BASO 0.02 10/15/2019    EOSINOPHIL 2.5 10/15/2019    BASOPHIL 0.2 10/15/2019         Abnormal x-ray  -     MRI Tibia Fibula W WO Contrast Left; Future; Expected date: 10/23/2019    Elevated serum immunoglobulin free light chain level  -     MRI Tibia Fibula W WO Contrast Left; Future; Expected date: 10/23/2019    Stage 3 chronic kidney disease    Leg swelling  -     MRI Tibia Fibula W WO Contrast Left; Future; Expected date: 10/23/2019         Iron def anemia with much improvement from oral iron and B vitamins   B 12 deficiency on replacement  Cont  calcium 600 mg daily   Cont D 3   Decrease D3 to MWF   MRI tibia in order to make sure this is not myeloma      Explain the elevated light chains are more than likely due to her chronic kidney disease as the kappa and lambda levels are both increased.  no pain   No falls  rtecently   Hepatic steatosis while on Coreg.  She will follow up with her primary care doctor to discuss further that this medication could be adding to her fatty liver  She should abstain from fatty foods and try to decrease her BMI  Low risk for thrombosis on plavix  No depression  Pt is to continue meds for HTN and thyroid disease.   She has felt with her nephrologist discussed ways to improve her kidney function.  Proper hydration been discussed as well.   cont statin to decrease plaques   Cont insulin for DM   RTC after MRI     Addendum  Pt has not had left leg ultrasound to rule out DVT   will also give her a referral for nephrologist   Thank you for allowing me to evaluate and participate in the care of this pleasant patient. Please fell free to call me with any questions or concerns.    Warmly,  Darby Moreno MD    This note was dictated with Dragon and briefly proofread. Please excuse any sentences that may be unclear or words misspelled

## 2019-10-24 ENCOUNTER — TELEPHONE (OUTPATIENT)
Dept: HEMATOLOGY/ONCOLOGY | Facility: CLINIC | Age: 59
End: 2019-10-24

## 2019-10-25 ENCOUNTER — HOSPITAL ENCOUNTER (OUTPATIENT)
Dept: RADIOLOGY | Facility: HOSPITAL | Age: 59
Discharge: HOME | End: 2019-10-25
Attending: INTERNAL MEDICINE
Payer: MEDICAID

## 2019-10-25 DIAGNOSIS — R93.89 ABNORMAL X-RAY: ICD-10-CM

## 2019-10-25 DIAGNOSIS — M79.89 LEG SWELLING: ICD-10-CM

## 2019-10-25 PROCEDURE — 93971 EXTREMITY STUDY: CPT | Mod: 26,LT,, | Performed by: RADIOLOGY

## 2019-10-25 PROCEDURE — 93971 US LOWER EXTREMITY VEINS LEFT: ICD-10-PCS | Mod: 26,LT,, | Performed by: RADIOLOGY

## 2019-10-25 PROCEDURE — 93971 EXTREMITY STUDY: CPT | Mod: TC,LT

## 2019-10-28 ENCOUNTER — HOSPITAL ENCOUNTER (OUTPATIENT)
Dept: RADIOLOGY | Facility: HOSPITAL | Age: 59
Discharge: HOME | End: 2019-10-28
Attending: INTERNAL MEDICINE
Payer: MEDICAID

## 2019-10-28 DIAGNOSIS — R93.89 ABNORMAL X-RAY: ICD-10-CM

## 2019-10-28 DIAGNOSIS — M79.89 LEG SWELLING: ICD-10-CM

## 2019-10-28 DIAGNOSIS — R76.8 ELEVATED SERUM IMMUNOGLOBULIN FREE LIGHT CHAIN LEVEL: ICD-10-CM

## 2019-10-28 PROCEDURE — 73718 MRI LOWER EXTREMITY W/O DYE: CPT | Mod: 26,LT,, | Performed by: RADIOLOGY

## 2019-10-28 PROCEDURE — 73718 MRI LOWER EXTREMITY W/O DYE: CPT | Mod: TC,LT

## 2019-10-28 PROCEDURE — 73718 MRI TIBIA FIBULA WITHOUT CONTRAST LEFT: ICD-10-PCS | Mod: 26,LT,, | Performed by: RADIOLOGY

## 2019-11-06 ENCOUNTER — OFFICE VISIT (OUTPATIENT)
Dept: HEMATOLOGY/ONCOLOGY | Facility: CLINIC | Age: 59
End: 2019-11-06
Payer: MEDICAID

## 2019-11-06 VITALS
DIASTOLIC BLOOD PRESSURE: 69 MMHG | HEIGHT: 65 IN | SYSTOLIC BLOOD PRESSURE: 147 MMHG | OXYGEN SATURATION: 99 % | BODY MASS INDEX: 29.99 KG/M2 | RESPIRATION RATE: 16 BRPM | HEART RATE: 92 BPM | WEIGHT: 180 LBS | TEMPERATURE: 97 F

## 2019-11-06 DIAGNOSIS — N18.30 STAGE 3 CHRONIC KIDNEY DISEASE: ICD-10-CM

## 2019-11-06 DIAGNOSIS — D63.1 ANEMIA IN CHRONIC KIDNEY DISEASE, UNSPECIFIED CKD STAGE: ICD-10-CM

## 2019-11-06 DIAGNOSIS — D50.8 OTHER IRON DEFICIENCY ANEMIA: ICD-10-CM

## 2019-11-06 DIAGNOSIS — D51.9 ANEMIA DUE TO VITAMIN B12 DEFICIENCY, UNSPECIFIED B12 DEFICIENCY TYPE: ICD-10-CM

## 2019-11-06 DIAGNOSIS — Z79.899 ON STATIN THERAPY: ICD-10-CM

## 2019-11-06 DIAGNOSIS — R76.8 ELEVATED SERUM IMMUNOGLOBULIN FREE LIGHT CHAIN LEVEL: Primary | ICD-10-CM

## 2019-11-06 DIAGNOSIS — N18.9 ANEMIA IN CHRONIC KIDNEY DISEASE, UNSPECIFIED CKD STAGE: ICD-10-CM

## 2019-11-06 DIAGNOSIS — D47.2 MGUS (MONOCLONAL GAMMOPATHY OF UNKNOWN SIGNIFICANCE): ICD-10-CM

## 2019-11-06 DIAGNOSIS — Z79.01 ANTICOAGULATED: ICD-10-CM

## 2019-11-06 PROCEDURE — 99213 OFFICE O/P EST LOW 20 MIN: CPT | Mod: PBBFAC,25 | Performed by: INTERNAL MEDICINE

## 2019-11-06 PROCEDURE — 99214 PR OFFICE/OUTPT VISIT, EST, LEVL IV, 30-39 MIN: ICD-10-PCS | Mod: S$PBB,,, | Performed by: INTERNAL MEDICINE

## 2019-11-06 PROCEDURE — 99999 PR PBB SHADOW E&M-EST. PATIENT-LVL III: ICD-10-PCS | Mod: PBBFAC,,, | Performed by: INTERNAL MEDICINE

## 2019-11-06 PROCEDURE — G0444 DEPRESSION SCREEN ANNUAL: HCPCS | Mod: PBBFAC | Performed by: INTERNAL MEDICINE

## 2019-11-06 PROCEDURE — 99214 OFFICE O/P EST MOD 30 MIN: CPT | Mod: S$PBB,,, | Performed by: INTERNAL MEDICINE

## 2019-11-06 PROCEDURE — 99999 PR PBB SHADOW E&M-EST. PATIENT-LVL III: CPT | Mod: PBBFAC,,, | Performed by: INTERNAL MEDICINE

## 2019-11-06 NOTE — LETTER
November 6, 2019      Monica Markham MD  [address]  [place] [address]           [facility]  [address] [place] [address]  Phone: [phone]          Patient: Sanjuana Mendoza   MR Number: [id number]   YOB: 1960   Date of Visit: 11/6/2019       Dear Dr. Monica Markham:    Thank you for referring Sanjuana Mendoza to me for evaluation. Attached you will find relevant portions of my assessment and plan of care.    If you have questions, please do not hesitate to call me. I look forward to following Sanjuana Mendoza along with you.    Sincerely,    Darby Moreno MD    Enclosure  CC:  No Recipients    If you would like to receive this communication electronically, please contact [email or web address] or [phone] to request more information on [facility] Link access.    For providers and/or their staff who would like to refer a patient to [facility], please contact us through our one-stop-shop provider referral line, [facility], at [phone].    If you feel you have received this communication in error or would no longer like to receive these types of communications, please e-mail [email or web address]

## 2019-11-06 NOTE — PROGRESS NOTES
"CC Here for F/U of anemia and elev light chains    Post skel survey     Sanjuana Mendoza is a 58 y.o.  Pt with history of anemia treated with oral iron by Dr Markham at [facility]. Here to check counts after taking oral iron   Post colonoscopy and EGD at [facility] : negative per patient   She has CKD and is on plavix after suffering a stroke   She is changing to [facility] due to insurance purposes She is tolerating synthroid for thyroid disease    She has had an ultrasound of her abdomen since her last visit which reveals hepatic steatosis and cholelithiasis with no cholecystitis   Tolerating insulin for diabetes and has abstained from salt for HTN     Iron levels have improved   Hemoglobin decreased only by 0.1  skel survey with osteoporosis and ? Lytic lesion in tibia  MRI recommended    Pt states she has had swelling of the left leg, no pain         Current Outpatient Medications:     amlodipine-benazepril 5-10 mg (LOTREL) 5-10 mg per capsule, , Disp: , Rfl: 1    carvedilol (COREG) 25 MG tablet, , Disp: , Rfl: 1    cholecalciferol, vitamin D3, 5,000 unit capsule, Take 5,000 Units by mouth once daily., Disp: , Rfl:     clopidogrel (PLAVIX) 75 mg tablet, , Disp: , Rfl: 11    cyanocobalamin 500 MCG tablet, Take 2,500 mcg by mouth once daily., Disp: , Rfl:     ferrous sulfate 324 mg (65 mg iron) TbEC, Take 325 mg by mouth once daily., Disp: , Rfl:     insulin degludec (TRESIBA FLEXTOUCH U-100) 100 unit/mL (3 mL) InPn, Inject 46 Units into the skin., Disp: , Rfl:     levothyroxine (SYNTHROID) 75 MCG tablet, Take 75 mcg by mouth once daily., Disp: , Rfl: 6    lovastatin (MEVACOR) 40 MG tablet, Take 40 mg by mouth nightly., Disp: , Rfl: 1    NOVOLOG FLEXPEN U-100 INSULIN 100 unit/mL (3 mL) InPn pen, , Disp: , Rfl: 19    pen needle, diabetic (RELION PEN NEEDLES) 32 gauge x 5/32" Ndle, Use once daily with insulin, Disp: , Rfl:     pen needle, diabetic 31 gauge x 1/3" Ndle, Use once daily with Lantus " "insulin, Disp: , Rfl:     sodium bicarbonate 325 MG tablet, Take 325 mg by mouth 4 (four) times daily., Disp: , Rfl:     VICTOZA 3-HERB 0.6 mg/0.1 mL (18 mg/3 mL) PnIj, INJECT 1.8 MG SUBCUTANEOUSLY ONCE DAILY FOR 30 DAYS, Disp: , Rfl: 11  Review of patient's allergies indicates:  No Known Allergies  Social History     Tobacco Use    Smoking status: Never Smoker    Smokeless tobacco: Never Used   Substance Use Topics    Alcohol use: Not Currently    Drug use: Not on file     History reviewed. No pertinent family history.  No change since last visit in history   CONSTITUTIONAL: No fevers, chills, night sweats, positive weight gain positive fatigue  SKIN: no rashes or itching  ENT: No headaches, head trauma, vision changes, or eye pain  LYMPH NODES: None noticed   CV: No chest pain, palpitations.  Leg swelling   RESP: No dyspnea on exertion, cough, wheezing, or hemoptysis  GI: No nausea, emesis, diarrhea, constipation, melena, hematochezia, pain.   : No dysuria, hematuria, urgency, or frequency   HEME: No easy bruising, bleeding problems  PSYCHIATRIC: No depression, anxiety, psychosis, hallucinations.  NEURO: No seizures, memory loss, dizziness or difficulty sleeping  MSK: No arthralgias   Gait requiring a walker         BP (!) 147/69   Pulse 92   Temp 96.5 °F (35.8 °C) (Oral)   Resp 16   Ht 5' 5" (1.651 m)   Wt 81.6 kg (180 lb)   SpO2 99%   BMI 29.95 kg/m²   Gen: NAD, A and O x3, well groomed conversant  Psych: pleasant affect, normal thought process no evidence of depression or anxiety today  Eyes: Pupils round and non dilated, EOM intact no nystagmus nares are patent non boggy turbinates  OP clear, mucosa patent  Neck: suppple, no JVD, trachea midline, no palpable mass, no adenopathy  Lungs: CTAB, no wheezes, no use of accessory muscles  CV: S1S2 with RRR, No mrg  Abd: soft, NTND, + BS, No HSM, no ascites  Extr: No CC  ALEJANDRO, strength normal, good capillary refill left leg swelling   Neuro: steady gait, " CNs grossly intact  Skin: intact, no lesions noted  Rheum: No joint swelling  Labs have been reviewed  Imaging studies reviewed    Ref Range & Epwxz9xa ago  Skhewbkv47.0 - 300.0 ng/mL273     Ref Range & Qxfbp2dx ago  Iron30 - 160 ug/dL71 Njxlqhfyuka989 - 375 mg/pH460Alx  FPSE898 - 450 ug/dL284 Saturated Iron20 - 50 %25     Lab Results   Component Value Date    WBC 11.93 10/15/2019    RBC 3.00 (L) 10/15/2019    HGB 9.3 (L) 10/15/2019    HCT 28.0 (L) 10/15/2019    MCV 93 10/15/2019    MCH 31.0 10/15/2019    MCHC 33.2 10/15/2019    RDW 12.4 10/15/2019     10/15/2019    MPV 9.3 10/15/2019    GRAN 9.2 (H) 10/15/2019    GRAN 77.1 (H) 10/15/2019    LYMPH 1.5 10/15/2019    LYMPH 12.2 (L) 10/15/2019    MONO 0.9 10/15/2019    MONO 7.7 10/15/2019    EOS 0.3 10/15/2019    BASO 0.02 10/15/2019    EOSINOPHIL 2.5 10/15/2019    BASOPHIL 0.2 10/15/2019     CMP  Sodium   Date Value Ref Range Status   10/15/2019 137 136 - 145 mmol/L Final     Potassium   Date Value Ref Range Status   10/15/2019 4.5 3.5 - 5.1 mmol/L Final     Chloride   Date Value Ref Range Status   10/15/2019 107 95 - 110 mmol/L Final     CO2   Date Value Ref Range Status   10/15/2019 20 (L) 23 - 29 mmol/L Final     Glucose   Date Value Ref Range Status   10/15/2019 264 (H) 70 - 110 mg/dL Final     BUN, Bld   Date Value Ref Range Status   10/15/2019 38 (H) 6 - 20 mg/dL Final     Creatinine   Date Value Ref Range Status   10/15/2019 2.5 (H) 0.5 - 1.4 mg/dL Final     Calcium   Date Value Ref Range Status   10/15/2019 8.5 (L) 8.7 - 10.5 mg/dL Final     Total Protein   Date Value Ref Range Status   10/07/2019 7.4 6.0 - 8.4 g/dL Final     Albumin   Date Value Ref Range Status   10/07/2019 3.5 3.5 - 5.2 g/dL Final     Total Bilirubin   Date Value Ref Range Status   10/07/2019 0.5 0.1 - 1.0 mg/dL Final     Comment:     For infants and newborns, interpretation of results should be based  on gestational age, weight and in agreement with  clinical  observations.  Premature Infant recommended reference ranges:  Up to 24 hours.............<8.0 mg/dL  Up to 48 hours............<12.0 mg/dL  3-5 days..................<15.0 mg/dL  6-29 days.................<15.0 mg/dL       Alkaline Phosphatase   Date Value Ref Range Status   10/07/2019 140 (H) 55 - 135 U/L Final     AST   Date Value Ref Range Status   10/07/2019 20 10 - 40 U/L Final     ALT   Date Value Ref Range Status   10/07/2019 30 10 - 44 U/L Final     Anion Gap   Date Value Ref Range Status   10/15/2019 10 8 - 16 mmol/L Final     eGFR if    Date Value Ref Range Status   10/15/2019 23.4 (A) >60 mL/min/1.73 m^2 Final     eGFR if non    Date Value Ref Range Status   10/15/2019 20.3 (A) >60 mL/min/1.73 m^2 Final     Comment:     Calculation used to obtain the estimated glomerular filtration  rate (eGFR) is the CKD-EPI equation.            Elevated serum immunoglobulin free light chain level  -     Vitamin B1; Future; Expected date: 11/06/2019  -     Vitamin B6; Future; Expected date: 11/06/2019  -     Methylmalonic acid, serum; Future; Expected date: 11/06/2019  -     Sjogrens syndrome-A extractable nuclear antibody; Future; Expected date: 11/06/2019  -     Sjogrens syndrome-B extractable nuclear antibody; Future; Expected date: 11/06/2019  -     IgE; Future; Expected date: 11/06/2019  -     CBC auto differential; Future; Expected date: 11/06/2019  -     Comprehensive metabolic panel; Future; Expected date: 11/06/2019    Stage 3 chronic kidney disease    Anemia due to vitamin B12 deficiency, unspecified B12 deficiency type    On statin therapy    Anticoagulated    MGUS (monoclonal gammopathy of unknown significance)  -     Vitamin B1; Future; Expected date: 11/06/2019  -     Vitamin B6; Future; Expected date: 11/06/2019  -     Methylmalonic acid, serum; Future; Expected date: 11/06/2019  -     Sjogrens syndrome-A extractable nuclear antibody; Future; Expected date:  11/06/2019  -     Sjogrens syndrome-B extractable nuclear antibody; Future; Expected date: 11/06/2019  -     IgE; Future; Expected date: 11/06/2019  -     CBC auto differential; Future; Expected date: 11/06/2019  -     Comprehensive metabolic panel; Future; Expected date: 11/06/2019    Other iron deficiency anemia    Anemia in chronic kidney disease, unspecified CKD stage  -     Vitamin B1; Future; Expected date: 11/06/2019  -     Vitamin B6; Future; Expected date: 11/06/2019  -     Methylmalonic acid, serum; Future; Expected date: 11/06/2019  -     Sjogrens syndrome-A extractable nuclear antibody; Future; Expected date: 11/06/2019  -     Sjogrens syndrome-B extractable nuclear antibody; Future; Expected date: 11/06/2019  -     IgE; Future; Expected date: 11/06/2019  -     CBC auto differential; Future; Expected date: 11/06/2019  -     Comprehensive metabolic panel; Future; Expected date: 11/06/2019         IRON Levels normalized   Refer to nephrology in [place] : [place] go to [place]  Cont  calcium 600 mg daily   Cont D 3   Decrease D3 to MWF because renal function  MRI tibia in order to make sure this is not myeloma      Explain the elevated light chains are more than likely due to her chronic kidney disease as the kappa and lambda levels are both increased.  no pain   No falls recently   Swelling to leg from old trauma : no DVT and no fracture   RTC  2 weeks  with labs   Hepatic steatosis while on Coreg.  She will follow up with her primary care doctor to discuss further that this medication could be adding to her fatty liver  She should abstain from fatty foods and try to decrease her BMI  Low risk for thrombosis on plavix  No depression  Pt is to continue meds for HTN and thyroid disease.   Cont insulin for DM     Thank you for allowing me to evaluate and participate in the care of this pleasant patient. Please fell free to call me with any questions or concerns.    Darby Davila MD    This note was  dictated with Dragon and briefly proofread. Please excuse any sentences that may be unclear or words misspelled

## 2019-11-26 ENCOUNTER — LAB VISIT (OUTPATIENT)
Dept: LAB | Facility: HOSPITAL | Age: 59
End: 2019-11-26
Attending: INTERNAL MEDICINE
Payer: MEDICAID

## 2019-11-26 DIAGNOSIS — D47.2 MGUS (MONOCLONAL GAMMOPATHY OF UNKNOWN SIGNIFICANCE): ICD-10-CM

## 2019-11-26 DIAGNOSIS — N18.9 ANEMIA IN CHRONIC KIDNEY DISEASE, UNSPECIFIED CKD STAGE: ICD-10-CM

## 2019-11-26 DIAGNOSIS — D63.1 ANEMIA IN CHRONIC KIDNEY DISEASE, UNSPECIFIED CKD STAGE: ICD-10-CM

## 2019-11-26 DIAGNOSIS — R76.8 ELEVATED SERUM IMMUNOGLOBULIN FREE LIGHT CHAIN LEVEL: ICD-10-CM

## 2019-11-26 LAB
ALBUMIN SERPL BCP-MCNC: 3.7 G/DL (ref 3.5–5.2)
ALP SERPL-CCNC: 115 U/L (ref 55–135)
ALT SERPL W/O P-5'-P-CCNC: 24 U/L (ref 10–44)
ANION GAP SERPL CALC-SCNC: 9 MMOL/L (ref 8–16)
AST SERPL-CCNC: 21 U/L (ref 10–40)
BASOPHILS # BLD AUTO: 0.02 K/UL (ref 0–0.2)
BASOPHILS NFR BLD: 0.2 % (ref 0–1.9)
BILIRUB SERPL-MCNC: 0.2 MG/DL (ref 0.1–1)
BUN SERPL-MCNC: 36 MG/DL (ref 6–20)
CALCIUM SERPL-MCNC: 8.7 MG/DL (ref 8.7–10.5)
CHLORIDE SERPL-SCNC: 109 MMOL/L (ref 95–110)
CO2 SERPL-SCNC: 20 MMOL/L (ref 23–29)
CREAT SERPL-MCNC: 2.4 MG/DL (ref 0.5–1.4)
DIFFERENTIAL METHOD: ABNORMAL
EOSINOPHIL # BLD AUTO: 0.3 K/UL (ref 0–0.5)
EOSINOPHIL NFR BLD: 3.1 % (ref 0–8)
ERYTHROCYTE [DISTWIDTH] IN BLOOD BY AUTOMATED COUNT: 12.2 % (ref 11.5–14.5)
EST. GFR  (AFRICAN AMERICAN): 24.6 ML/MIN/1.73 M^2
EST. GFR  (NON AFRICAN AMERICAN): 21.3 ML/MIN/1.73 M^2
GLUCOSE SERPL-MCNC: 78 MG/DL (ref 70–110)
HCT VFR BLD AUTO: 29.2 % (ref 37–48.5)
HGB BLD-MCNC: 9.6 G/DL (ref 12–16)
IGE SERPL-ACNC: 1178 IU/ML (ref 0–100)
IMM GRANULOCYTES # BLD AUTO: 0.04 K/UL (ref 0–0.04)
IMM GRANULOCYTES NFR BLD AUTO: 0.4 % (ref 0–0.5)
LYMPHOCYTES # BLD AUTO: 1.8 K/UL (ref 1–4.8)
LYMPHOCYTES NFR BLD: 18.1 % (ref 18–48)
MCH RBC QN AUTO: 30.9 PG (ref 27–31)
MCHC RBC AUTO-ENTMCNC: 32.9 G/DL (ref 32–36)
MCV RBC AUTO: 94 FL (ref 82–98)
MONOCYTES # BLD AUTO: 0.9 K/UL (ref 0.3–1)
MONOCYTES NFR BLD: 9.5 % (ref 4–15)
NEUTROPHILS # BLD AUTO: 6.8 K/UL (ref 1.8–7.7)
NEUTROPHILS NFR BLD: 68.7 % (ref 38–73)
NRBC BLD-RTO: 0 /100 WBC
PLATELET # BLD AUTO: 334 K/UL (ref 150–350)
PMV BLD AUTO: 9.1 FL (ref 9.2–12.9)
POTASSIUM SERPL-SCNC: 4.9 MMOL/L (ref 3.5–5.1)
PROT SERPL-MCNC: 7.6 G/DL (ref 6–8.4)
RBC # BLD AUTO: 3.11 M/UL (ref 4–5.4)
SODIUM SERPL-SCNC: 138 MMOL/L (ref 136–145)
WBC # BLD AUTO: 9.91 K/UL (ref 3.9–12.7)

## 2019-11-26 PROCEDURE — 83921 ORGANIC ACID SINGLE QUANT: CPT

## 2019-11-26 PROCEDURE — 84207 ASSAY OF VITAMIN B-6: CPT

## 2019-11-26 PROCEDURE — 80053 COMPREHEN METABOLIC PANEL: CPT

## 2019-11-26 PROCEDURE — 85025 COMPLETE CBC W/AUTO DIFF WBC: CPT

## 2019-11-26 PROCEDURE — 82785 ASSAY OF IGE: CPT

## 2019-11-26 PROCEDURE — 84425 ASSAY OF VITAMIN B-1: CPT

## 2019-11-26 PROCEDURE — 36415 COLL VENOUS BLD VENIPUNCTURE: CPT

## 2019-11-26 PROCEDURE — 86235 NUCLEAR ANTIGEN ANTIBODY: CPT

## 2019-11-26 PROCEDURE — 86235 NUCLEAR ANTIGEN ANTIBODY: CPT | Mod: 59

## 2019-11-27 LAB
ANTI-SSA ANTIBODY: 2.97 EU (ref 0–19.99)
ANTI-SSA INTERPRETATION: NEGATIVE
ANTI-SSB ANTIBODY: 2.76 EU (ref 0–19.99)
ANTI-SSB INTERPRETATION: NEGATIVE

## 2019-11-30 LAB
METHYLMALONATE SERPL-SCNC: 0.41 UMOL/L
VIT B1 BLD-MCNC: 52 UG/L (ref 38–122)

## 2019-12-03 LAB — PYRIDOXAL SERPL-MCNC: <2 UG/L (ref 5–50)

## 2019-12-04 ENCOUNTER — OFFICE VISIT (OUTPATIENT)
Dept: HEMATOLOGY/ONCOLOGY | Facility: CLINIC | Age: 59
End: 2019-12-04
Payer: MEDICAID

## 2019-12-04 VITALS
DIASTOLIC BLOOD PRESSURE: 65 MMHG | HEIGHT: 65 IN | HEART RATE: 89 BPM | BODY MASS INDEX: 30.54 KG/M2 | WEIGHT: 183.31 LBS | SYSTOLIC BLOOD PRESSURE: 141 MMHG | TEMPERATURE: 97 F | OXYGEN SATURATION: 99 % | RESPIRATION RATE: 18 BRPM

## 2019-12-04 DIAGNOSIS — I10 ESSENTIAL HYPERTENSION: ICD-10-CM

## 2019-12-04 DIAGNOSIS — Z79.01 ANTICOAGULATED: ICD-10-CM

## 2019-12-04 DIAGNOSIS — R76.8 ELEVATED SERUM IMMUNOGLOBULIN FREE LIGHT CHAIN LEVEL: Primary | ICD-10-CM

## 2019-12-04 DIAGNOSIS — N18.30 STAGE 3 CHRONIC KIDNEY DISEASE: ICD-10-CM

## 2019-12-04 PROCEDURE — 99999 PR PBB SHADOW E&M-EST. PATIENT-LVL III: CPT | Mod: PBBFAC,,, | Performed by: INTERNAL MEDICINE

## 2019-12-04 PROCEDURE — 99213 OFFICE O/P EST LOW 20 MIN: CPT | Mod: PBBFAC,25 | Performed by: INTERNAL MEDICINE

## 2019-12-04 PROCEDURE — G0444 DEPRESSION SCREEN ANNUAL: HCPCS | Mod: PBBFAC | Performed by: INTERNAL MEDICINE

## 2019-12-04 PROCEDURE — 99214 OFFICE O/P EST MOD 30 MIN: CPT | Mod: S$PBB,,, | Performed by: INTERNAL MEDICINE

## 2019-12-04 PROCEDURE — 99214 PR OFFICE/OUTPT VISIT, EST, LEVL IV, 30-39 MIN: ICD-10-PCS | Mod: S$PBB,,, | Performed by: INTERNAL MEDICINE

## 2019-12-04 PROCEDURE — 99999 PR PBB SHADOW E&M-EST. PATIENT-LVL III: ICD-10-PCS | Mod: PBBFAC,,, | Performed by: INTERNAL MEDICINE

## 2019-12-04 RX ORDER — INSULIN DEGLUDEC 200 U/ML
INJECTION, SOLUTION SUBCUTANEOUS
Refills: 3 | COMMUNITY
Start: 2019-11-13

## 2019-12-04 NOTE — LETTER
December 4, 2019      Monica Markham MD  [address]  [place] [address]           [facility]  [address] [place] [address]  Phone: [phone]          Patient: Sanjuana Mendoza   MR Number: [id number]   YOB: 1960   Date of Visit: 12/4/2019       Dear Dr. Monica Markham:    Thank you for referring Sanjuana Mendoza to me for evaluation. Attached you will find relevant portions of my assessment and plan of care.    If you have questions, please do not hesitate to call me. I look forward to following Sanjuana Mendoza along with you.    Sincerely,    Darby Moreno MD    Enclosure  CC:  No Recipients    If you would like to receive this communication electronically, please contact [email or web address] or [phone] to request more information on [employer] Link access.    For providers and/or their staff who would like to refer a patient to [facility], please contact us through our one-stop-shop provider referral line, [facility], at [phone].    If you feel you have received this communication in error or would no longer like to receive these types of communications, please e-mail [email or web address]

## 2019-12-04 NOTE — PROGRESS NOTES
CC Here for F/U of anemia and elev light chains    Post skel survey     Sanjuana Mendoza is a 58 y.o.  Pt with history of anemia treated with oral iron by Dr Markham at [facility]. Here to check counts after taking oral iron   Post colonoscopy and EGD at [facility] : negative per patient   She has CKD and is on plavix after suffering a stroke   She is changing to [facility] due to insurance purposes She is tolerating synthroid for thyroid disease    She has had an ultrasound of her abdomen since her last visit which reveals hepatic steatosis and cholelithiasis with no cholecystitis   Tolerating insulin for diabetes and has abstained from salt for HTN     Iron levels have improved   Hemoglobin decreased only by 0.1  skel survey with osteoporosis and ? Lytic lesion in tibia  MRI recommended  1. MR findings suggestive of chronic healed proximal left fibular shaft fracture with prominent osseous callus formation.  No obvious pathologic lesion identified.  Comparison with outside studies recommended, if available.     December 4, 2019  IgG level above a 1000 as above MRI shows no obvious pathological lesion in the tibia      Current Outpatient Medications:     amlodipine-benazepril 5-10 mg (LOTREL) 5-10 mg per capsule, , Disp: , Rfl: 1    carvedilol (COREG) 25 MG tablet, , Disp: , Rfl: 1    cholecalciferol, vitamin D3, 5,000 unit capsule, Take 5,000 Units by mouth once daily., Disp: , Rfl:     clopidogrel (PLAVIX) 75 mg tablet, , Disp: , Rfl: 11    cyanocobalamin 500 MCG tablet, Take 2,500 mcg by mouth once daily., Disp: , Rfl:     ferrous sulfate 324 mg (65 mg iron) TbEC, Take 325 mg by mouth once daily., Disp: , Rfl:     insulin degludec (TRESIBA FLEXTOUCH U-100) 100 unit/mL (3 mL) InPn, Inject 46 Units into the skin., Disp: , Rfl:     levothyroxine (SYNTHROID) 75 MCG tablet, Take 75 mcg by mouth once daily., Disp: , Rfl: 6    lovastatin (MEVACOR) 40 MG tablet, Take 40 mg by mouth nightly., Disp: , Rfl: 1    " NOVOLOG FLEXPEN U-100 INSULIN 100 unit/mL (3 mL) InPn pen, , Disp: , Rfl: 19    pen needle, diabetic (RELION PEN NEEDLES) 32 gauge x 5/32" Ndle, Use once daily with insulin, Disp: , Rfl:     pen needle, diabetic 31 gauge x 1/3" Ndle, Use once daily with Lantus insulin, Disp: , Rfl:     sodium bicarbonate 325 MG tablet, Take 325 mg by mouth 4 (four) times daily., Disp: , Rfl:     VICTOZA 3-HERB 0.6 mg/0.1 mL (18 mg/3 mL) PnIj, INJECT 1.8 MG SUBCUTANEOUSLY ONCE DAILY FOR 30 DAYS, Disp: , Rfl: 11  Review of patient's allergies indicates:  No Known Allergies  Social History     Tobacco Use    Smoking status: Never Smoker    Smokeless tobacco: Never Used   Substance Use Topics    Alcohol use: Not Currently    Drug use: Not on file     No family history on file.  No change since last visit in history   CONSTITUTIONAL: No fevers, chills, night sweats, positive weight gain positive fatigue  SKIN: no rashes or itching  ENT: No headaches, head trauma, vision changes, positive postnasal drip  LYMPH NODES: None noticed   CV: No chest pain, palpitations.  Leg swelling   RESP: No dyspnea on exertion, cough, wheezing, or hemoptysis  GI: No nausea, emesis, diarrhea, constipation, melena, hematochezia, pain.   : No dysuria, hematuria, urgency, or frequency   HEME: No easy bruising, bleeding problems  PSYCHIATRIC: No depression, anxiety, psychosis, hallucinations.  NEURO: No seizures, memory loss, dizziness or difficulty sleeping  MSK:  Positive arthralgias  Gait requiring a walker         BP (!) 141/65 (BP Location: Left arm, Patient Position: Sitting, BP Method: Large (Automatic))   Pulse 89   Temp 96.6 °F (35.9 °C) (Oral)   Resp 18   Ht 5' 5" (1.651 m)   Wt 83.1 kg (183 lb 4.8 oz)   SpO2 99%   BMI 30.50 kg/m²   Gen: NAD, A and O x3, well groomed conversant  Psych: pleasant affect, normal thought process no evidence of depression or anxiety today  Eyes: Pupils round and non dilated, EOM intact no nystagmus nares " are patent non boggy turbinates  OP with erythematous pharynx postnasal drip evident no ulcerations or pustules noted, mucosa patent  Neck: suppple, no JVD, trachea midline, no palpable mass, no adenopathy  Lungs: CTAB, no wheezes, no use of accessory muscles  CV: S1S2 with RRR, No mrg  Abd: soft, NTND, + BS, No HSM, no ascites  Extr: No CC  ALEJANDRO, strength  decreased today  Neuro:  Needs walker to help with her gait CNs grossly intact  Skin: intact, no lesions noted  Rheum: No joint swelling  Labs have been reviewed  Imaging studies reviewed    Ref Range & [id number] ago  Yydzwwhv83.0 - 300.0 ng/mL273     Ref Range & Xwmsk6aa ago  Iron30 - 160 ug/dL71 Seulskznbkz377 - 375 mg/aA236Aut  GSJD782 - 450 ug/dL284 Saturated Iron20 - 50 %25     Ig E 1178  MMA remains elevated at 0.41 which is improved from 0.64  B6 less than 2          Lab Results   Component Value Date    WBC 9.91 11/26/2019    RBC 3.11 (L) 11/26/2019    HGB 9.6 (L) 11/26/2019    HCT 29.2 (L) 11/26/2019    MCV 94 11/26/2019    MCH 30.9 11/26/2019    MCHC 32.9 11/26/2019    RDW 12.2 11/26/2019     11/26/2019    MPV 9.1 (L) 11/26/2019    GRAN 6.8 11/26/2019    GRAN 68.7 11/26/2019    LYMPH 1.8 11/26/2019    LYMPH 18.1 11/26/2019    MONO 0.9 11/26/2019    MONO 9.5 11/26/2019    EOS 0.3 11/26/2019    BASO 0.02 11/26/2019    EOSINOPHIL 3.1 11/26/2019    BASOPHIL 0.2 11/26/2019     CMP  Sodium   Date Value Ref Range Status   11/26/2019 138 136 - 145 mmol/L Final     Potassium   Date Value Ref Range Status   11/26/2019 4.9 3.5 - 5.1 mmol/L Final     Chloride   Date Value Ref Range Status   11/26/2019 109 95 - 110 mmol/L Final     CO2   Date Value Ref Range Status   11/26/2019 20 (L) 23 - 29 mmol/L Final     Glucose   Date Value Ref Range Status   11/26/2019 78 70 - 110 mg/dL Final     BUN, Bld   Date Value Ref Range Status   11/26/2019 36 (H) 6 - 20 mg/dL Final     Creatinine   Date Value Ref Range Status   11/26/2019 2.4 (H) 0.5 - 1.4 mg/dL Final      Calcium   Date Value Ref Range Status   11/26/2019 8.7 8.7 - 10.5 mg/dL Final     Total Protein   Date Value Ref Range Status   11/26/2019 7.6 6.0 - 8.4 g/dL Final     Albumin   Date Value Ref Range Status   11/26/2019 3.7 3.5 - 5.2 g/dL Final     Total Bilirubin   Date Value Ref Range Status   11/26/2019 0.2 0.1 - 1.0 mg/dL Final     Comment:     For infants and newborns, interpretation of results should be based  on gestational age, weight and in agreement with clinical  observations.  Premature Infant recommended reference ranges:  Up to 24 hours.............<8.0 mg/dL  Up to 48 hours............<12.0 mg/dL  3-5 days..................<15.0 mg/dL  6-29 days.................<15.0 mg/dL       Alkaline Phosphatase   Date Value Ref Range Status   11/26/2019 115 55 - 135 U/L Final     AST   Date Value Ref Range Status   11/26/2019 21 10 - 40 U/L Final     ALT   Date Value Ref Range Status   11/26/2019 24 10 - 44 U/L Final     Anion Gap   Date Value Ref Range Status   11/26/2019 9 8 - 16 mmol/L Final     eGFR if    Date Value Ref Range Status   11/26/2019 24.6 (A) >60 mL/min/1.73 m^2 Final     eGFR if non    Date Value Ref Range Status   11/26/2019 21.3 (A) >60 mL/min/1.73 m^2 Final     Comment:     Calculation used to obtain the estimated glomerular filtration  rate (eGFR) is the CKD-EPI equation.            Elevated serum immunoglobulin free light chain level  -     CT Dx Bone Marrow Biopsy(ies) w/ Aspiration: Left(XPD); Future; Expected date: 12/04/2019  -     Leukemia/Lymphoma Screen - Bone Marrow Left Posterior Iliac Crest; Future; Expected date: 12/04/2019  -     Chromosome Analysis, Bone Marrow; Future; Expected date: 12/04/2019  -     X-Ray Metastatic Survey; Future; Expected date: 12/04/2019  -     X-Ray Skull Complete Min 4 Views; Future; Expected date: 12/04/2019    Anticoagulated    Stage 3 chronic kidney disease    Essential hypertension         Given her degree of IGE  elevation.  It is warranted that she have a bone marrow biopsy to make sure she does not have multiple myeloma.  Explain the renal abnormality this could be associated with such.  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  She in the midst of changing insurance company so will call in a couple of weeks to see if that went through in the will schedule her bone marrow biopsy and aspirate.  Differentials been discussed in detail  Refer to nephrology in [place] 1 she has new insurance  Cont  calcium 600 mg daily   Cont D 3 for D3 deficiency at a decreased dose Monday Wednesday Friday  because renal function       No falls recently   Swelling to leg from old trauma : no DVT and no fracture   RTC  3 weeks after bone marrow biopsy will stay in touch with her by phone  Hepatic steatosis while on Coreg.     She should abstain from fatty foods and try to decrease her BMI again discussed diet over 15 min  Low risk for thrombosis on plavix  No depression  Pt is to continue meds for HTN and thyroid disease.   Cont insulin for DM     Thank you for allowing me to evaluate and participate in the care of this pleasant patient. Please fell free to call me with any questions or concerns.    Darby Davila MD    This note was dictated with Dragon and briefly proofread. Please excuse any sentences that may be unclear or words misspelled